# Patient Record
Sex: FEMALE | Race: WHITE
[De-identification: names, ages, dates, MRNs, and addresses within clinical notes are randomized per-mention and may not be internally consistent; named-entity substitution may affect disease eponyms.]

---

## 2017-10-15 ENCOUNTER — HEALTH MAINTENANCE LETTER (OUTPATIENT)
Age: 52
End: 2017-10-15

## 2017-12-14 ENCOUNTER — TELEPHONE (OUTPATIENT)
Dept: FAMILY MEDICINE | Facility: CLINIC | Age: 52
End: 2017-12-14

## 2017-12-14 NOTE — TELEPHONE ENCOUNTER
12/14/2017    Call Regarding Preventive Health Screening Colonoscopy and Mammogram    Attempt 1    Message on voicemail     Comments:       Outreach   Sb

## 2018-03-17 NOTE — TELEPHONE ENCOUNTER
3/17/2018    Call Regarding ReattributionPhysical       Attempt 2    Message on voicemail     Comments:       Outreach   SV

## 2018-03-31 NOTE — TELEPHONE ENCOUNTER
3/31/2018    Call Regarding ReattributionPhysical       Attempt 3    Message on voicemail     Comments:       Outreach   SV

## 2018-04-23 ENCOUNTER — HEALTH MAINTENANCE LETTER (OUTPATIENT)
Age: 53
End: 2018-04-23

## 2018-10-15 ENCOUNTER — HEALTH MAINTENANCE LETTER (OUTPATIENT)
Age: 53
End: 2018-10-15

## 2018-10-29 ENCOUNTER — HEALTH MAINTENANCE LETTER (OUTPATIENT)
Age: 53
End: 2018-10-29

## 2020-02-24 ENCOUNTER — HEALTH MAINTENANCE LETTER (OUTPATIENT)
Age: 55
End: 2020-02-24